# Patient Record
Sex: FEMALE | Race: WHITE | Employment: UNEMPLOYED | RURAL
[De-identification: names, ages, dates, MRNs, and addresses within clinical notes are randomized per-mention and may not be internally consistent; named-entity substitution may affect disease eponyms.]

---

## 2019-04-26 PROBLEM — R06.09 DYSPNEA ON EXERTION: Status: RESOLVED | Noted: 2017-01-26 | Resolved: 2019-04-26

## 2019-04-26 PROBLEM — I10 ESSENTIAL HYPERTENSION: Status: ACTIVE | Noted: 2017-03-16

## 2019-04-26 PROBLEM — I25.10 ATHEROSCLEROSIS OF CORONARY ARTERY: Status: ACTIVE | Noted: 2017-07-15

## 2019-04-26 PROBLEM — M67.442 GANGLION CYST OF JOINT OF FINGER OF LEFT HAND: Status: ACTIVE | Noted: 2018-11-07

## 2019-04-26 PROBLEM — R06.09 DYSPNEA ON EXERTION: Status: ACTIVE | Noted: 2017-01-26

## 2019-04-26 PROBLEM — I65.21 STENOSIS OF RIGHT CAROTID ARTERY: Status: ACTIVE | Noted: 2019-04-26

## 2019-04-26 PROBLEM — I65.23 BILATERAL CAROTID ARTERY STENOSIS: Status: ACTIVE | Noted: 2019-04-26

## 2019-04-26 PROBLEM — G89.4 CHRONIC PAIN DISORDER: Status: ACTIVE | Noted: 2019-04-19

## 2019-04-26 PROBLEM — M43.10 SPONDYLOLISTHESIS: Status: ACTIVE | Noted: 2017-02-01

## 2019-04-26 PROBLEM — Z87.42 HISTORY OF ABNORMAL CERVICAL PAP SMEAR: Status: ACTIVE | Noted: 2019-04-26

## 2019-04-26 PROBLEM — N83.202 OVARIAN CYST, LEFT: Status: ACTIVE | Noted: 2019-04-26

## 2019-05-21 PROBLEM — R23.8: Status: ACTIVE | Noted: 2018-02-26

## 2019-05-21 PROBLEM — R10.84 GENERALIZED ABDOMINAL PAIN: Status: ACTIVE | Noted: 2017-07-12

## 2019-05-21 PROBLEM — M54.2 NECK PAIN OF OVER 3 MONTHS DURATION: Status: ACTIVE | Noted: 2018-01-07

## 2019-05-21 PROBLEM — Z11.59 NEED FOR HEPATITIS C SCREENING TEST: Status: ACTIVE | Noted: 2018-08-02

## 2019-05-21 PROBLEM — K65.4 FAT NECROSIS OF ABDOMINAL WALL (HCC): Status: ACTIVE | Noted: 2017-07-18

## 2019-05-21 PROBLEM — D17.0 LIPOMA OF NECK: Status: ACTIVE | Noted: 2017-05-21

## 2019-05-22 PROBLEM — Z11.59 NEED FOR HEPATITIS C SCREENING TEST: Status: RESOLVED | Noted: 2018-08-02 | Resolved: 2019-05-22

## 2019-05-22 PROBLEM — Z87.42 HISTORY OF ABNORMAL CERVICAL PAP SMEAR: Status: RESOLVED | Noted: 2019-04-26 | Resolved: 2019-05-22

## 2019-05-22 PROBLEM — K65.4 FAT NECROSIS OF ABDOMINAL WALL (HCC): Status: RESOLVED | Noted: 2017-07-18 | Resolved: 2019-05-22

## 2019-05-22 PROBLEM — E66.01 SEVERE OBESITY (HCC): Status: ACTIVE | Noted: 2019-05-22

## 2019-05-22 PROBLEM — R10.84 GENERALIZED ABDOMINAL PAIN: Status: RESOLVED | Noted: 2017-07-12 | Resolved: 2019-05-22

## 2021-03-15 ENCOUNTER — HOSPITAL ENCOUNTER (EMERGENCY)
Age: 66
Discharge: HOME | End: 2021-03-15
Attending: EMERGENCY MEDICINE
Payer: MEDICAID

## 2021-03-15 VITALS
TEMPERATURE: 97.9 F | BODY MASS INDEX: 29.73 KG/M2 | HEART RATE: 76 BPM | OXYGEN SATURATION: 98 % | HEIGHT: 66 IN | DIASTOLIC BLOOD PRESSURE: 71 MMHG | RESPIRATION RATE: 18 BRPM | SYSTOLIC BLOOD PRESSURE: 154 MMHG | WEIGHT: 185 LBS

## 2021-03-15 DIAGNOSIS — L03.115 CELLULITIS OF RIGHT LOWER EXTREMITY: Primary | ICD-10-CM

## 2021-03-15 PROCEDURE — 99282 EMERGENCY DEPT VISIT SF MDM: CPT

## 2021-03-15 RX ORDER — CLINDAMYCIN HYDROCHLORIDE 150 MG/1
450 CAPSULE ORAL 4 TIMES DAILY
Qty: 84 CAP | Refills: 0 | Status: SHIPPED | OUTPATIENT
Start: 2021-03-15 | End: 2021-03-22

## 2021-03-15 NOTE — ED PROVIDER NOTES
EMERGENCY DEPARTMENT HISTORY AND PHYSICAL EXAM          Date: 3/15/2021  Patient Name: Cherylene Amend    History of Presenting Illness     Chief Complaint   Patient presents with    Rash       History Provided By: Patient    HPI: Cherylene Amend is a 72 y.o. female, pmhx listed below, who presents to the ED c/o leg swelling. Patient reports right leg pain and swelling gradually worsening over the last few days. Associated with leg redness. No injuries or trauma. No falls. No cough or shortness of breath. History of cellulitis approximately 20 years ago, patient reports symptoms today are similar to that. No fever. No treatments or evaluations prior to arrival.        PCP: Jennifer Mcneil NP    There are no other complaints, changes, or physical findings at this time. Past History       Past Medical History:  Past Medical History:   Diagnosis Date    Abnormal Pap smear of cervix     Back pain     Bronchitis     COPD (chronic obstructive pulmonary disease) (HCC)     Emphysema lung (HCC)     History of abnormal cervical Pap smear 4/26/2019    S/p cryotherapy in past. GYN is DR. Reddy, last pap 2016was ok.  Hyperlipemia     Hypertension        Past Surgical History:  Past Surgical History:   Procedure Laterality Date    HX BREAST AUGMENTATION         Family History:  Family History   Problem Relation Age of Onset    Lung Cancer Mother     Hypertension Mother     COPD Mother     Heart Attack Father     Stroke Father     Cancer Father        Social History:  Social History     Tobacco Use    Smoking status: Current Every Day Smoker     Types: Cigarettes    Smokeless tobacco: Never Used   Substance Use Topics    Alcohol use: Never     Frequency: Never     Binge frequency: Never    Drug use: Never       Current Outpatient Medications   Medication Sig Dispense Refill    clindamycin (CLEOCIN) 150 mg capsule Take 3 Caps by mouth four (4) times daily for 7 days.  84 Cap 0    oxyCODONE-acetaminophen (PERCOCET) 5-325 mg per tablet Take 1 Tab by mouth every four (4) hours as needed for Pain.  montelukast (SINGULAIR) 10 mg tablet Take 1 Tab by mouth daily. Indications: breathing and allergies 90 Tab 3    DULoxetine (CYMBALTA) 30 mg capsule Take 1 Cap by mouth daily. Indications: arthritis and nerve 30 Cap 11    ezetimibe (ZETIA) 10 mg tablet Take 1 Tab by mouth daily. Indications: excessive fat in the blood, cholesterol and heart 30 Tab 11    lisinopril-hydroCHLOROthiazide (PRINZIDE, ZESTORETIC) 10-12.5 mg per tablet Take 1 Tab by mouth daily. 90 Tab 3    atorvastatin (LIPITOR) 80 mg tablet Take 1 Tab by mouth daily. 90 Tab 3    ammonium lactate (AMLACTIN) 12 % topical cream Apply 1 Applicator to affected area three (3) times daily.  aspirin delayed-release 81 mg tablet Take 1 Tab by mouth daily.  omeprazole (PRILOSEC) 40 mg capsule Take 1 Cap by mouth daily as needed.  ANORO ELLIPTA 62.5-25 mcg/actuation inhaler Take 1 Puff by mouth daily. Allergies: Allergies   Allergen Reactions    Penicillin V Hives         Review of Systems   Review of Systems   Constitutional: Negative for chills and fever. HENT: Negative for congestion. Eyes: Negative for pain. Respiratory: Negative for shortness of breath. Cardiovascular: Negative for chest pain. Gastrointestinal: Negative for abdominal pain. Genitourinary: Negative for flank pain. Musculoskeletal: Negative for back pain. Skin: Negative for wound. Neurological: Negative for headaches. Psychiatric/Behavioral: Negative for agitation. Physical Exam     Vital Signs-Reviewed the patient's vital signs. Patient Vitals for the past 12 hrs:   Temp Pulse Resp BP SpO2   03/15/21 1234 97.9 °F (36.6 °C) 76 18 (!) 154/71 98 %       Physical Exam  Constitutional:       Appearance: Normal appearance. HENT:      Head: Normocephalic and atraumatic.       Mouth/Throat:      Mouth: Mucous membranes are moist.   Eyes:      Pupils: Pupils are equal, round, and reactive to light. Cardiovascular:      Rate and Rhythm: Normal rate and regular rhythm. Pulmonary:      Effort: Pulmonary effort is normal.      Breath sounds: Normal breath sounds. Abdominal:      Tenderness: There is no abdominal tenderness. Musculoskeletal:         General: No swelling. Comments: Right lower extremity erythema extending over entire lower leg (distal to the knee extending inferiorly to ankle and foot). Normal DP pulse. Associated induration and warmth. No fluctuance. No drainage. Skin:     General: Skin is warm and dry. Neurological:      Mental Status: She is alert and oriented to person, place, and time. Psychiatric:         Mood and Affect: Mood normal.         Diagnostic Study Results     Labs -   No results found for this or any previous visit (from the past 12 hour(s)). Radiologic Studies -   No orders to display     CT Results  (Last 48 hours)    None        CXR Results  (Last 48 hours)    None              Medical Decision Making   I am the first provider for this patient. I reviewed the vital signs, available nursing notes, past medical history, past surgical history, family history and social history. Records Reviewed: Nursing Notes and Old Medical Records    Provider Notes (Medical Decision Making):   MDM: 42-year-old female with lower extremity cellulitis. Patient is nontoxic-appearing. Normal distal pulses. Will give trial of oral antibiotics at home. We discussed likelihood that antibiotics would start to show improvement after about 24 hours as well as reasons to return to the emergency department such as fever or extended area of redness. All questions have been answered, pt voiced understanding and agreement with plan. Specific return precautions provided as well as instructions to return to the ED should sx worsen at any time. Vital signs stable for discharge.        Diagnosis Clinical Impression:   1. Cellulitis of right lower extremity            Disposition:  Discharged    Discharge Medication List as of 3/15/2021  1:28 PM      START taking these medications    Details   clindamycin (CLEOCIN) 150 mg capsule Take 3 Caps by mouth four (4) times daily for 7 days. , Normal, Disp-84 Cap, R-0         CONTINUE these medications which have NOT CHANGED    Details   oxyCODONE-acetaminophen (PERCOCET) 5-325 mg per tablet Take 1 Tab by mouth every four (4) hours as needed for Pain., Historical Med      montelukast (SINGULAIR) 10 mg tablet Take 1 Tab by mouth daily. Indications: breathing and allergies, Normal, Disp-90 Tab, R-3      DULoxetine (CYMBALTA) 30 mg capsule Take 1 Cap by mouth daily. Indications: arthritis and nerve, Normal, Disp-30 Cap, R-11      ezetimibe (ZETIA) 10 mg tablet Take 1 Tab by mouth daily. Indications: excessive fat in the blood, cholesterol and heart, Normal, Disp-30 Tab, R-11      lisinopril-hydroCHLOROthiazide (PRINZIDE, ZESTORETIC) 10-12.5 mg per tablet Take 1 Tab by mouth daily. , Normal, Disp-90 Tab, R-3      atorvastatin (LIPITOR) 80 mg tablet Take 1 Tab by mouth daily. , Normal, Disp-90 Tab, R-3      ammonium lactate (AMLACTIN) 12 % topical cream Apply 1 Applicator to affected area three (3) times daily. , Historical Med      aspirin delayed-release 81 mg tablet Take 1 Tab by mouth daily. , Historical Med      omeprazole (PRILOSEC) 40 mg capsule Take 1 Cap by mouth daily as needed., Historical Med      ANORO ELLIPTA 62.5-25 mcg/actuation inhaler Take 1 Puff by mouth daily. , Historical Med, DIEGO               Please note, this dictation was completed with [employer], the [employer] voice recognition software. Quite often unanticipated grammatical, syntax, homophones, and other interpretive errors are inadvertently transcribed by the computer software. Please disregard these errors. Please excuse any errors that have escaped final proof reading.

## 2021-03-15 NOTE — ED NOTES
1st contact with this patient:  Patient identified. Patient noted to have arrived by POV with complaint of right leg swelling and possible cellulitis. Pt reports she noticed some redness on the top of her foot that spread up her right leg and now she has swelling and pain. Pt is awake alert and oriented X 4  This writer apologized for having her sit in triage but at this time no beds available and once a bed is available than she will be moved, pt vocalized understanding.   Pt educated on ER flow pt awaits provider eval.

## 2021-04-01 ENCOUNTER — TRANSCRIBE ORDER (OUTPATIENT)
Dept: SCHEDULING | Age: 66
End: 2021-04-01

## 2021-04-01 DIAGNOSIS — M79.89 SWELLING OF LIMB: Primary | ICD-10-CM

## 2021-04-01 DIAGNOSIS — R22.1 NECK MASS: ICD-10-CM

## 2021-04-07 ENCOUNTER — HOSPITAL ENCOUNTER (OUTPATIENT)
Dept: ULTRASOUND IMAGING | Age: 66
Discharge: HOME | End: 2021-04-07
Attending: NURSE PRACTITIONER
Payer: MEDICAID

## 2021-04-07 DIAGNOSIS — M79.89 SWELLING OF LIMB: ICD-10-CM

## 2021-04-07 DIAGNOSIS — R22.1 NECK MASS: ICD-10-CM

## 2021-04-07 PROCEDURE — 93971 EXTREMITY STUDY: CPT

## 2021-04-07 PROCEDURE — 76536 US EXAM OF HEAD AND NECK: CPT

## 2021-04-14 ENCOUNTER — TRANSCRIBE ORDER (OUTPATIENT)
Dept: SCHEDULING | Age: 66
End: 2021-04-14

## 2021-04-14 DIAGNOSIS — I65.23 BILATERAL CAROTID ARTERY OCCLUSION: Primary | ICD-10-CM

## 2021-04-20 ENCOUNTER — HOSPITAL ENCOUNTER (OUTPATIENT)
Dept: ULTRASOUND IMAGING | Age: 66
Discharge: HOME | End: 2021-04-20
Attending: NURSE PRACTITIONER
Payer: MEDICAID

## 2021-04-20 DIAGNOSIS — I65.23 BILATERAL CAROTID ARTERY OCCLUSION: ICD-10-CM

## 2021-04-20 LAB
LEFT CCA DIST DIAS: 28.1 CENTIMETER/SECOND
LEFT CCA DIST SYS: 117.4 CENTIMETER/SECOND
LEFT ECA DIAS: 17.86 CENTIMETER/SECOND
LEFT ECA SYS: 146.6 CENTIMETER/SECOND
LEFT ICA DIST DIAS: 24 CENTIMETER/SECOND
LEFT ICA DIST SYS: 85.8 CENTIMETER/SECOND
LEFT ICA MID DIAS: 20.3 CENTIMETER/SECOND
LEFT ICA MID SYS: 84.3 CENTIMETER/SECOND
LEFT ICA PROX DIAS: 24 CM/S
LEFT ICA PROX SYS: 74 CM/S
LEFT ICA/CCA SYS: 0.73
LEFT VERTEBRAL DIAS: 15.98 CENTIMETER/SECOND
LEFT VERTEBRAL SYS: 42.9 CENTIMETER/SECOND
RIGHT CCA DIST DIAS: 20.3 CENTIMETER/SECOND
RIGHT CCA DIST SYS: 81.2 CENTIMETER/SECOND
RIGHT ECA DIAS: 38.12 CENTIMETER/SECOND
RIGHT ECA SYS: 244.4 CENTIMETER/SECOND
RIGHT ICA DIST DIAS: 26.1 CENTIMETER/SECOND
RIGHT ICA DIST SYS: 76 CENTIMETER/SECOND
RIGHT ICA MID DIAS: 42 CM/S
RIGHT ICA MID SYS: 165 CM/S
RIGHT ICA PROX DIAS: 59 CM/S
RIGHT ICA PROX SYS: 153 CM/S
RIGHT ICA/CCA SYS: 1.9
RIGHT VERTEBRAL DIAS: 24.41 CENTIMETER/SECOND
RIGHT VERTEBRAL SYS: 77 CENTIMETER/SECOND

## 2021-04-20 PROCEDURE — 93880 EXTRACRANIAL BILAT STUDY: CPT

## 2021-07-21 ENCOUNTER — HOSPITAL ENCOUNTER (OUTPATIENT)
Dept: GENERAL RADIOLOGY | Age: 66
Discharge: HOME | End: 2021-07-21
Payer: MEDICAID

## 2021-07-21 ENCOUNTER — TRANSCRIBE ORDER (OUTPATIENT)
Dept: REGISTRATION | Age: 66
End: 2021-07-21

## 2021-07-21 DIAGNOSIS — M54.2 NECK PAIN: Primary | ICD-10-CM

## 2021-07-21 DIAGNOSIS — M48.061 NEURAL FORAMINAL STENOSIS OF LUMBAR SPINE: ICD-10-CM

## 2021-07-21 DIAGNOSIS — M54.2 NECK PAIN: ICD-10-CM

## 2021-07-21 PROCEDURE — 72040 X-RAY EXAM NECK SPINE 2-3 VW: CPT

## 2021-07-21 PROCEDURE — 72100 X-RAY EXAM L-S SPINE 2/3 VWS: CPT

## 2021-12-06 ENCOUNTER — HOSPITAL ENCOUNTER (OUTPATIENT)
Dept: ULTRASOUND IMAGING | Age: 66
Discharge: HOME | End: 2021-12-06
Attending: INTERNAL MEDICINE
Payer: MEDICAID

## 2021-12-06 ENCOUNTER — TRANSCRIBE ORDER (OUTPATIENT)
Dept: SCHEDULING | Age: 66
End: 2021-12-06

## 2021-12-06 DIAGNOSIS — M79.604 PAIN OF RIGHT LOWER EXTREMITY: Primary | ICD-10-CM

## 2021-12-06 DIAGNOSIS — M79.604 PAIN OF RIGHT LOWER EXTREMITY: ICD-10-CM

## 2021-12-06 PROCEDURE — 93970 EXTREMITY STUDY: CPT

## 2022-01-11 ENCOUNTER — TRANSCRIBE ORDER (OUTPATIENT)
Dept: SCHEDULING | Age: 67
End: 2022-01-11

## 2022-01-11 DIAGNOSIS — R60.0 EDEMA OF BOTH LEGS: ICD-10-CM

## 2022-01-11 DIAGNOSIS — R60.9 EDEMA: ICD-10-CM

## 2022-01-11 DIAGNOSIS — R59.9 ENLARGED LYMPH NODE: ICD-10-CM

## 2022-01-11 DIAGNOSIS — I10 ESSENTIAL HYPERTENSION: Primary | ICD-10-CM

## 2022-01-11 DIAGNOSIS — I73.9 PERIPHERAL VASCULAR DISEASE (HCC): ICD-10-CM

## 2022-01-19 ENCOUNTER — HOSPITAL ENCOUNTER (OUTPATIENT)
Dept: ULTRASOUND IMAGING | Age: 67
Discharge: HOME | End: 2022-01-19
Attending: NURSE PRACTITIONER
Payer: MEDICAID

## 2022-01-19 DIAGNOSIS — I10 ESSENTIAL HYPERTENSION: ICD-10-CM

## 2022-01-19 DIAGNOSIS — R59.9 ENLARGED LYMPH NODE: ICD-10-CM

## 2022-01-19 DIAGNOSIS — R60.9 EDEMA: ICD-10-CM

## 2022-01-19 DIAGNOSIS — R60.0 EDEMA OF BOTH LEGS: ICD-10-CM

## 2022-01-19 DIAGNOSIS — I73.9 PERIPHERAL VASCULAR DISEASE (HCC): ICD-10-CM

## 2022-01-19 LAB
ECHO AO ROOT DIAM: 3 CM
ECHO LA DIAMETER: 3.7 CM
ECHO LA TO AORTIC ROOT RATIO: 1.23
ECHO LA VOL 2C: 53 ML (ref 22–52)
ECHO LA VOL 4C: 50 ML (ref 22–52)
ECHO LA VOLUME AREA LENGTH: 54 ML
ECHO LV EDV A2C: 79 ML
ECHO LV EDV A4C: 86 ML
ECHO LV EDV BP: 83 ML (ref 56–104)
ECHO LV EDV BP: 83 ML (ref 56–104)
ECHO LV EJECTION FRACTION A2C: 73 %
ECHO LV EJECTION FRACTION A4C: 63 %
ECHO LV EJECTION FRACTION BIPLANE: 68 % (ref 55–100)
ECHO LV EJECTION FRACTION BIPLANE: 68 % (ref 55–100)
ECHO LV ESV A2C: 21 ML
ECHO LV ESV A4C: 32 ML
ECHO LV ESV BP: 27 ML (ref 19–49)
ECHO LV FRACTIONAL SHORTENING: 48 % (ref 28–44)
ECHO LV INTERNAL DIMENSION DIASTOLIC: 5 CM (ref 3.9–5.3)
ECHO LV INTERNAL DIMENSION SYSTOLIC: 2.6 CM
ECHO LV IVSD: 1.1 CM (ref 0.6–0.9)
ECHO LV MASS 2D: 194.4 G (ref 67–162)
ECHO LV POSTERIOR WALL DIASTOLIC: 1 CM (ref 0.6–0.9)
ECHO LV RELATIVE WALL THICKNESS RATIO: 0.4
ECHO LVOT AREA: 3.1 CM2
ECHO LVOT DIAM: 2 CM
LEFT ABI: 1.03
LEFT ARM BP: 154 MMHG
LEFT POSTERIOR TIBIAL: 158 MMHG
RIGHT ABI: 1.06
RIGHT ARM BP: 151 MMHG
RIGHT POSTERIOR TIBIAL: 164 MMHG
VAS LEFT DORSALIS PEDIS BP: 158 MMHG
VAS RIGHT DORSALIS PEDIS BP: 151 MMHG

## 2022-01-19 PROCEDURE — 76536 US EXAM OF HEAD AND NECK: CPT

## 2022-01-19 PROCEDURE — 93308 TTE F-UP OR LMTD: CPT

## 2022-01-19 PROCEDURE — 93325 DOPPLER ECHO COLOR FLOW MAPG: CPT | Performed by: INTERNAL MEDICINE

## 2022-01-19 PROCEDURE — 93308 TTE F-UP OR LMTD: CPT | Performed by: INTERNAL MEDICINE

## 2022-01-19 PROCEDURE — 93922 UPR/L XTREMITY ART 2 LEVELS: CPT

## 2022-03-18 PROBLEM — E66.01 SEVERE OBESITY (HCC): Status: ACTIVE | Noted: 2019-05-22

## 2022-03-18 PROBLEM — I10 ESSENTIAL HYPERTENSION: Status: ACTIVE | Noted: 2017-03-16

## 2022-03-18 PROBLEM — R23.8: Status: ACTIVE | Noted: 2018-02-26

## 2022-03-18 PROBLEM — M43.10 SPONDYLOLISTHESIS: Status: ACTIVE | Noted: 2017-02-01

## 2022-03-19 PROBLEM — N83.202 OVARIAN CYST, LEFT: Status: ACTIVE | Noted: 2019-04-26

## 2022-03-19 PROBLEM — I65.23 BILATERAL CAROTID ARTERY STENOSIS: Status: ACTIVE | Noted: 2019-04-26

## 2022-03-19 PROBLEM — I25.10 ATHEROSCLEROSIS OF CORONARY ARTERY: Status: ACTIVE | Noted: 2017-07-15

## 2022-03-19 PROBLEM — M54.2 NECK PAIN OF OVER 3 MONTHS DURATION: Status: ACTIVE | Noted: 2018-01-07

## 2022-03-20 PROBLEM — D17.0 LIPOMA OF NECK: Status: ACTIVE | Noted: 2017-05-21

## 2022-03-20 PROBLEM — M67.442 GANGLION CYST OF JOINT OF FINGER OF LEFT HAND: Status: ACTIVE | Noted: 2018-11-07

## 2022-03-20 PROBLEM — G89.4 CHRONIC PAIN DISORDER: Status: ACTIVE | Noted: 2019-04-19

## 2022-07-15 ENCOUNTER — TRANSCRIBE ORDER (OUTPATIENT)
Dept: REGISTRATION | Age: 67
End: 2022-07-15

## 2022-07-15 ENCOUNTER — HOSPITAL ENCOUNTER (OUTPATIENT)
Dept: GENERAL RADIOLOGY | Age: 67
Discharge: HOME | End: 2022-07-15
Payer: MEDICAID

## 2022-07-15 DIAGNOSIS — F17.210 CIGARETTE SMOKER: Primary | ICD-10-CM

## 2022-07-15 DIAGNOSIS — F17.210 CIGARETTE SMOKER: ICD-10-CM

## 2022-07-15 PROCEDURE — 71046 X-RAY EXAM CHEST 2 VIEWS: CPT

## 2022-09-07 ENCOUNTER — APPOINTMENT (OUTPATIENT)
Dept: ULTRASOUND IMAGING | Age: 67
End: 2022-09-07
Attending: EMERGENCY MEDICINE
Payer: MEDICAID

## 2022-09-07 ENCOUNTER — HOSPITAL ENCOUNTER (EMERGENCY)
Age: 67
Discharge: HOME | End: 2022-09-07
Attending: EMERGENCY MEDICINE
Payer: MEDICAID

## 2022-09-07 VITALS
SYSTOLIC BLOOD PRESSURE: 111 MMHG | DIASTOLIC BLOOD PRESSURE: 58 MMHG | TEMPERATURE: 97.8 F | OXYGEN SATURATION: 97 % | RESPIRATION RATE: 18 BRPM | HEART RATE: 68 BPM

## 2022-09-07 DIAGNOSIS — L03.115 CELLULITIS OF RIGHT LOWER EXTREMITY: Primary | ICD-10-CM

## 2022-09-07 LAB
ALBUMIN SERPL-MCNC: 4.1 G/DL (ref 3.5–5)
ALBUMIN/GLOB SERPL: 1.4 {RATIO} (ref 1.1–2.2)
ALP SERPL-CCNC: 75 U/L (ref 45–117)
ALT SERPL-CCNC: 27 U/L (ref 12–78)
ANION GAP SERPL CALC-SCNC: 8 MMOL/L (ref 5–15)
APPEARANCE UR: CLEAR
AST SERPL-CCNC: 22 U/L (ref 15–37)
BASOPHILS # BLD: 0.1 K/UL (ref 0–0.1)
BASOPHILS NFR BLD: 1 % (ref 0–1)
BILIRUB SERPL-MCNC: 0.4 MG/DL (ref 0.2–1)
BILIRUB UR QL: NEGATIVE
BNP SERPL-MCNC: 38 PG/ML (ref 0–125)
BUN SERPL-MCNC: 9 MG/DL (ref 6–20)
BUN/CREAT SERPL: 14 (ref 12–20)
CALCIUM SERPL-MCNC: 9.6 MG/DL (ref 8.5–10.1)
CHLORIDE SERPL-SCNC: 104 MMOL/L (ref 97–108)
CO2 SERPL-SCNC: 27 MMOL/L (ref 21–32)
COLOR UR: NORMAL
CREAT SERPL-MCNC: 0.66 MG/DL (ref 0.55–1.02)
DIFFERENTIAL METHOD BLD: ABNORMAL
EOSINOPHIL # BLD: 0.5 K/UL (ref 0–0.4)
EOSINOPHIL NFR BLD: 7 % (ref 0–7)
ERYTHROCYTE [DISTWIDTH] IN BLOOD BY AUTOMATED COUNT: 13.4 % (ref 11.5–14.5)
GLOBULIN SER CALC-MCNC: 2.9 G/DL (ref 2–4)
GLUCOSE SERPL-MCNC: 85 MG/DL (ref 65–100)
GLUCOSE UR STRIP.AUTO-MCNC: NEGATIVE MG/DL
HCT VFR BLD AUTO: 40.3 % (ref 35–47)
HGB BLD-MCNC: 13.4 G/DL (ref 11.5–16)
HGB UR QL STRIP: NEGATIVE
IMM GRANULOCYTES # BLD AUTO: 0 K/UL (ref 0–0.04)
IMM GRANULOCYTES NFR BLD AUTO: 0 % (ref 0–0.5)
KETONES UR QL STRIP.AUTO: NEGATIVE MG/DL
LEUKOCYTE ESTERASE UR QL STRIP.AUTO: NEGATIVE
LYMPHOCYTES # BLD: 2.3 K/UL (ref 0.8–3.5)
LYMPHOCYTES NFR BLD: 33 % (ref 12–49)
MAGNESIUM SERPL-MCNC: 2.3 MG/DL (ref 1.6–2.4)
MCH RBC QN AUTO: 32.4 PG (ref 26–34)
MCHC RBC AUTO-ENTMCNC: 33.3 G/DL (ref 30–36.5)
MCV RBC AUTO: 97.6 FL (ref 80–99)
MONOCYTES # BLD: 0.5 K/UL (ref 0–1)
MONOCYTES NFR BLD: 7 % (ref 5–13)
NEUTS SEG # BLD: 3.5 K/UL (ref 1.8–8)
NEUTS SEG NFR BLD: 52 % (ref 32–75)
NITRITE UR QL STRIP.AUTO: NEGATIVE
NRBC # BLD: 0 K/UL (ref 0–0.01)
NRBC BLD-RTO: 0 PER 100 WBC
PH UR STRIP: 6 [PH] (ref 5–8)
PLATELET # BLD AUTO: 197 K/UL (ref 150–400)
PMV BLD AUTO: 9.7 FL (ref 8.9–12.9)
POTASSIUM SERPL-SCNC: 3.8 MMOL/L (ref 3.5–5.1)
PROT SERPL-MCNC: 7 G/DL (ref 6.4–8.2)
PROT UR STRIP-MCNC: NEGATIVE MG/DL
RBC # BLD AUTO: 4.13 M/UL (ref 3.8–5.2)
SODIUM SERPL-SCNC: 139 MMOL/L (ref 136–145)
SP GR UR REFRACTOMETRY: <1.005 (ref 1–1.03)
UROBILINOGEN UR QL STRIP.AUTO: 0.2 EU/DL (ref 0.2–1)
WBC # BLD AUTO: 6.8 K/UL (ref 3.6–11)

## 2022-09-07 PROCEDURE — 85025 COMPLETE CBC W/AUTO DIFF WBC: CPT

## 2022-09-07 PROCEDURE — 83735 ASSAY OF MAGNESIUM: CPT

## 2022-09-07 PROCEDURE — 83880 ASSAY OF NATRIURETIC PEPTIDE: CPT

## 2022-09-07 PROCEDURE — 80053 COMPREHEN METABOLIC PANEL: CPT

## 2022-09-07 PROCEDURE — 81003 URINALYSIS AUTO W/O SCOPE: CPT

## 2022-09-07 PROCEDURE — 93970 EXTREMITY STUDY: CPT

## 2022-09-07 PROCEDURE — 99284 EMERGENCY DEPT VISIT MOD MDM: CPT

## 2022-09-07 PROCEDURE — 36415 COLL VENOUS BLD VENIPUNCTURE: CPT

## 2022-09-07 RX ORDER — SODIUM CHLORIDE 0.9 % (FLUSH) 0.9 %
5-10 SYRINGE (ML) INJECTION ONCE
Status: DISCONTINUED | OUTPATIENT
Start: 2022-09-07 | End: 2022-09-07 | Stop reason: HOSPADM

## 2022-09-07 RX ORDER — HYDROCODONE BITARTRATE AND ACETAMINOPHEN 5; 325 MG/1; MG/1
1 TABLET ORAL
Qty: 10 TABLET | Refills: 0 | Status: SHIPPED | OUTPATIENT
Start: 2022-09-07 | End: 2022-09-10

## 2022-09-07 RX ORDER — CEPHALEXIN 500 MG/1
500 CAPSULE ORAL 3 TIMES DAILY
Qty: 21 CAPSULE | Refills: 0 | Status: SHIPPED | OUTPATIENT
Start: 2022-09-07 | End: 2022-09-14

## 2022-09-07 NOTE — ED PROVIDER NOTES
EMERGENCY DEPARTMENT HISTORY AND PHYSICAL EXAM          Date: 9/7/2022  Patient Name: Lionel Flannery    History of Presenting Illness     Chief Complaint   Patient presents with    Leg Swelling       History Provided By: Patient    HPI: Lionel Flannery is a 77 y.o. female, pmhx COPD, hypertension, who presents ambulatory to the ED c/o leg sleg swelling with wounds    She explains she had a wound on her left thigh which she noticed a couple of days ago and one on her right lower leg. She states her not healing and now it seems to be getting worse with surrounding erythema. She denies any fevers, chills, nausea, vomiting, diarrhea as well as abdominal cramping. She notes they are itchy and painful. PCP: Brooke Ann NP    Allergies: Penicillin    There are no other complaints, changes, or physical findings at this time. Current Outpatient Medications   Medication Sig Dispense Refill    ondansetron (ZOFRAN ODT) 4 mg disintegrating tablet Take 1 Tablet by mouth every eight (8) hours as needed for Nausea. 6 Tablet 0    cephALEXin (Keflex) 500 mg capsule Take 1 Capsule by mouth three (3) times daily for 7 days. 21 Capsule 0    HYDROcodone-acetaminophen (Norco) 5-325 mg per tablet Take 1 Tablet by mouth every six (6) hours as needed for Pain for up to 3 days. Max Daily Amount: 4 Tablets. 10 Tablet 0    oxyCODONE-acetaminophen (PERCOCET) 5-325 mg per tablet Take 1 Tab by mouth every four (4) hours as needed for Pain.      montelukast (SINGULAIR) 10 mg tablet Take 1 Tab by mouth daily. Indications: breathing and allergies 90 Tab 3    DULoxetine (CYMBALTA) 30 mg capsule Take 1 Cap by mouth daily. Indications: arthritis and nerve 30 Cap 11    ezetimibe (ZETIA) 10 mg tablet Take 1 Tab by mouth daily. Indications: excessive fat in the blood, cholesterol and heart 30 Tab 11    lisinopril-hydroCHLOROthiazide (PRINZIDE, ZESTORETIC) 10-12.5 mg per tablet Take 1 Tab by mouth daily.  90 Tab 3    atorvastatin (LIPITOR) 80 mg tablet Take 1 Tab by mouth daily. 90 Tab 3    ammonium lactate (AMLACTIN) 12 % topical cream Apply 1 Applicator to affected area three (3) times daily. aspirin delayed-release 81 mg tablet Take 1 Tab by mouth daily. omeprazole (PRILOSEC) 40 mg capsule Take 1 Cap by mouth daily as needed. ANORO ELLIPTA 62.5-25 mcg/actuation inhaler Take 1 Puff by mouth daily. Past History     Past Medical History:  Past Medical History:   Diagnosis Date    Abnormal Pap smear of cervix     Back pain     Bronchitis     COPD (chronic obstructive pulmonary disease) (HCC)     Emphysema lung (HCC)     History of abnormal cervical Pap smear 4/26/2019    S/p cryotherapy in past. GYN is DR. Reddy, last pap 2016was ok. Hyperlipemia     Hypertension        Past Surgical History:  Past Surgical History:   Procedure Laterality Date    HX BREAST AUGMENTATION         Family History:  Family History   Problem Relation Age of Onset    Lung Cancer Mother     Hypertension Mother     COPD Mother     Heart Attack Father     Stroke Father     Cancer Father        Social History:  Social History     Tobacco Use    Smoking status: Every Day     Types: Cigarettes    Smokeless tobacco: Never   Vaping Use    Vaping Use: Never used   Substance Use Topics    Alcohol use: Never    Drug use: Never       Allergies: Allergies   Allergen Reactions    Penicillin V Hives         Review of Systems   Review of Systems   Constitutional:  Negative for activity change, appetite change, chills, fever and unexpected weight change. HENT:  Negative for congestion. Eyes:  Negative for pain and visual disturbance. Respiratory:  Negative for cough and shortness of breath. Cardiovascular:  Positive for leg swelling. Negative for chest pain. Gastrointestinal:  Negative for abdominal pain, diarrhea, nausea and vomiting. Genitourinary:  Negative for dysuria. Musculoskeletal:  Negative for back pain. Skin:  Positive for wound.  Negative for rash. Neurological:  Negative for headaches. Physical Exam   Physical Exam  Vitals and nursing note reviewed. Constitutional:       Appearance: She is well-developed. She is not diaphoretic. Comments: Overweight middle-aged female, hemodynamically stable but appearing uncomfortable in mild acute distress   HENT:      Head: Normocephalic and atraumatic. Eyes:      General:         Right eye: No discharge. Left eye: No discharge. Conjunctiva/sclera: Conjunctivae normal.      Pupils: Pupils are equal, round, and reactive to light. Cardiovascular:      Rate and Rhythm: Normal rate and regular rhythm. Heart sounds: Normal heart sounds. No murmur heard. Pulmonary:      Effort: Pulmonary effort is normal. No respiratory distress. Breath sounds: Normal breath sounds. No wheezing or rales. Musculoskeletal:         General: Normal range of motion. Cervical back: Normal range of motion and neck supple. Skin:     General: Skin is warm and dry. Findings: Erythema (Right lower leg from the mid anterior shin level down to the ankle; left anterior thigh) present. No rash. Comments: There are bite site/wound sites in the middle of these erythematous lesions. The center of these wounds appears to be in a necrotic black lesion   Neurological:      Mental Status: She is alert and oriented to person, place, and time. Cranial Nerves: No cranial nerve deficit. Motor: No abnormal muscle tone. Diagnostic Study Results     Labs -   No results found for this or any previous visit (from the past 12 hour(s)). Radiologic Studies -   DUPLEX LOWER EXT VENOUS BILAT   Final Result        CT Results  (Last 48 hours)      None          CXR Results  (Last 48 hours)      None              Medical Decision Making   I am the first provider for this patient.     I reviewed the vital signs, available nursing notes, past medical history, past surgical history, family history and social history. Vital Signs-Reviewed the patient's vital signs. Pulse Oximetry Analysis - 97% on RA    Records Reviewed: Nursing Notes and Old Medical Records    Provider Notes (Medical Decision Making):   MDM: Middle-aged female sitting in the hallway with normal vital signs and what appears to be some right lower extremity edema. Patient is wearing skinny jeans I have requested her to be released in a room, undressed for further evaluation. ED Course:   Initial assessment performed. The patients presenting problems have been discussed, and they are in agreement with the care plan formulated and outlined with them. I have encouraged them to ask questions as they arise throughout their visit. PROGRESS NOTE:  3:10PM  Pt reevaluated while undressed. She has lesions as mentioned above in the exam.  Concern for possible brown recluse bite given the appearance of the lesions. We will initiate antibiotics here as there is surrounding erythema concerning for early cellulitis. Discussed with patient wound care and if provided information for the [facility] wound care clinic. Discharge note:  Pt re-evaluated and noted to be feeling better, ready for discharge. Updated pt on all final lab findings. Will follow up as instructed. All questions have been answered, pt voiced understanding and agreement with plan. Abx were prescribed, pt advised that diarrhea and rash are possible side effects of the medications. Specific return precautions provided as well as instructions to return to the ED should sx worsen at any time. Vital signs stable for discharge. Critical Care Time:   0      Diagnosis     Clinical Impression:   1. Cellulitis of right lower extremity        PLAN:  1. Discharge Medication List as of 9/7/2022  2:52 PM        START taking these medications    Details   cephALEXin (Keflex) 500 mg capsule Take 1 Capsule by mouth three (3) times daily for 7 days. , Normal, Disp-21 Capsule, R-0      HYDROcodone-acetaminophen (Norco) 5-325 mg per tablet Take 1 Tablet by mouth every six (6) hours as needed for Pain for up to 3 days. Max Daily Amount: 4 Tablets., Normal, Disp-10 Tablet, R-0           CONTINUE these medications which have NOT CHANGED    Details   oxyCODONE-acetaminophen (PERCOCET) 5-325 mg per tablet Take 1 Tab by mouth every four (4) hours as needed for Pain., Historical Med      montelukast (SINGULAIR) 10 mg tablet Take 1 Tab by mouth daily. Indications: breathing and allergies, Normal, Disp-90 Tab, R-3      DULoxetine (CYMBALTA) 30 mg capsule Take 1 Cap by mouth daily. Indications: arthritis and nerve, Normal, Disp-30 Cap, R-11      ezetimibe (ZETIA) 10 mg tablet Take 1 Tab by mouth daily. Indications: excessive fat in the blood, cholesterol and heart, Normal, Disp-30 Tab, R-11      lisinopril-hydroCHLOROthiazide (PRINZIDE, ZESTORETIC) 10-12.5 mg per tablet Take 1 Tab by mouth daily. , Normal, Disp-90 Tab, R-3      atorvastatin (LIPITOR) 80 mg tablet Take 1 Tab by mouth daily. , Normal, Disp-90 Tab, R-3      ammonium lactate (AMLACTIN) 12 % topical cream Apply 1 Applicator to affected area three (3) times daily. , Historical Med      aspirin delayed-release 81 mg tablet Take 1 Tab by mouth daily. , Historical Med      omeprazole (PRILOSEC) 40 mg capsule Take 1 Cap by mouth daily as needed., Historical Med      ANORO ELLIPTA 62.5-25 mcg/actuation inhaler Take 1 Puff by mouth daily. , Historical Med, DIEGO           2. Follow-up Information       Follow up With Specialties Details Why Contact Info    [address] [place] [address] Emergency Medicine  If symptoms worsen [address]  [id number]    [facility] OP WOUND CARE Wound Care Schedule an appointment as soon as possible for a visit   [address] [place] [address] 1  Jakub.  [address] [place] [address]  [phone]          Return to ED if worse     Disposition:  Home       Please note, this dictation was completed with Dragon, the computer voice recognition software. Quite often unanticipated grammatical, syntax, homophones, and other interpretive errors are inadvertently transcribed by the computer software. Please disregard these errors. Please excuse any errors that have escaped final proof reading.

## 2022-09-07 NOTE — Clinical Note
[address] [place] [address] EMERGENCY DEP  [address] [place] [address]  [phone]    Work/School Note    Date: 9/7/2022    To Whom It May concern:    Ninoska Khan was seen and treated today in the emergency room by the following provider(s):  Attending Provider: Annika Fonseca MD.      Ninoska Khan is excused from work/school on 09/07/22 and 09/08/22. She is medically clear to return to work/school on 9/9/2022. Sincerely,          India Perdomo.  MD Mariel

## 2022-09-08 RX ORDER — ONDANSETRON 4 MG/1
4 TABLET, ORALLY DISINTEGRATING ORAL
Qty: 6 TABLET | Refills: 0 | Status: SHIPPED | OUTPATIENT
Start: 2022-09-08

## 2023-05-24 ENCOUNTER — OFFICE VISIT (OUTPATIENT)
Age: 68
End: 2023-05-24
Payer: MEDICAID

## 2023-05-24 VITALS
TEMPERATURE: 97.3 F | OXYGEN SATURATION: 98 % | BODY MASS INDEX: 29.89 KG/M2 | SYSTOLIC BLOOD PRESSURE: 120 MMHG | HEART RATE: 72 BPM | DIASTOLIC BLOOD PRESSURE: 60 MMHG | HEIGHT: 66 IN | WEIGHT: 186 LBS | RESPIRATION RATE: 18 BRPM

## 2023-05-24 DIAGNOSIS — G89.29 OTHER CHRONIC PAIN: ICD-10-CM

## 2023-05-24 DIAGNOSIS — F17.200 SMOKING: ICD-10-CM

## 2023-05-24 DIAGNOSIS — Z12.31 ENCOUNTER FOR SCREENING MAMMOGRAM FOR MALIGNANT NEOPLASM OF BREAST: ICD-10-CM

## 2023-05-24 DIAGNOSIS — I65.23 BILATERAL CAROTID ARTERY STENOSIS: ICD-10-CM

## 2023-05-24 DIAGNOSIS — E78.2 MIXED HYPERLIPIDEMIA: ICD-10-CM

## 2023-05-24 DIAGNOSIS — R73.9 HYPERGLYCEMIA: Primary | ICD-10-CM

## 2023-05-24 DIAGNOSIS — M47.816 SPONDYLOSIS OF LUMBAR REGION WITHOUT MYELOPATHY OR RADICULOPATHY: ICD-10-CM

## 2023-05-24 DIAGNOSIS — Z51.81 THERAPEUTIC DRUG MONITORING: ICD-10-CM

## 2023-05-24 DIAGNOSIS — I10 ESSENTIAL HYPERTENSION: ICD-10-CM

## 2023-05-24 DIAGNOSIS — I25.10 ATHEROSCLEROSIS OF NATIVE CORONARY ARTERY OF NATIVE HEART WITHOUT ANGINA PECTORIS: ICD-10-CM

## 2023-05-24 PROCEDURE — 3078F DIAST BP <80 MM HG: CPT | Performed by: FAMILY MEDICINE

## 2023-05-24 PROCEDURE — 99203 OFFICE O/P NEW LOW 30 MIN: CPT | Performed by: FAMILY MEDICINE

## 2023-05-24 PROCEDURE — 3074F SYST BP LT 130 MM HG: CPT | Performed by: FAMILY MEDICINE

## 2023-05-24 PROCEDURE — 1123F ACP DISCUSS/DSCN MKR DOCD: CPT | Performed by: FAMILY MEDICINE

## 2023-05-24 RX ORDER — AMLODIPINE BESYLATE 10 MG/1
10 TABLET ORAL DAILY
COMMUNITY

## 2023-05-24 RX ORDER — ATORVASTATIN CALCIUM 80 MG/1
80 TABLET, FILM COATED ORAL DAILY
Qty: 30 TABLET | Refills: 11 | Status: SHIPPED | OUTPATIENT
Start: 2023-05-24

## 2023-05-24 RX ORDER — NICOTINE 21 MG/24HR
1 PATCH, TRANSDERMAL 24 HOURS TRANSDERMAL DAILY
Qty: 42 PATCH | Refills: 3 | Status: SHIPPED | OUTPATIENT
Start: 2023-05-24 | End: 2023-07-05

## 2023-05-24 RX ORDER — ALBUTEROL SULFATE 2.5 MG/3ML
2.5 SOLUTION RESPIRATORY (INHALATION) EVERY 4 HOURS PRN
COMMUNITY

## 2023-05-24 SDOH — ECONOMIC STABILITY: FOOD INSECURITY: WITHIN THE PAST 12 MONTHS, THE FOOD YOU BOUGHT JUST DIDN'T LAST AND YOU DIDN'T HAVE MONEY TO GET MORE.: NEVER TRUE

## 2023-05-24 SDOH — ECONOMIC STABILITY: HOUSING INSECURITY
IN THE LAST 12 MONTHS, WAS THERE A TIME WHEN YOU DID NOT HAVE A STEADY PLACE TO SLEEP OR SLEPT IN A SHELTER (INCLUDING NOW)?: NO

## 2023-05-24 SDOH — ECONOMIC STABILITY: FOOD INSECURITY: WITHIN THE PAST 12 MONTHS, YOU WORRIED THAT YOUR FOOD WOULD RUN OUT BEFORE YOU GOT MONEY TO BUY MORE.: NEVER TRUE

## 2023-05-24 SDOH — ECONOMIC STABILITY: INCOME INSECURITY: HOW HARD IS IT FOR YOU TO PAY FOR THE VERY BASICS LIKE FOOD, HOUSING, MEDICAL CARE, AND HEATING?: NOT HARD AT ALL

## 2023-05-24 ASSESSMENT — PATIENT HEALTH QUESTIONNAIRE - PHQ9
3. TROUBLE FALLING OR STAYING ASLEEP: 0
SUM OF ALL RESPONSES TO PHQ QUESTIONS 1-9: 0
SUM OF ALL RESPONSES TO PHQ QUESTIONS 1-9: 0
2. FEELING DOWN, DEPRESSED OR HOPELESS: 0
7. TROUBLE CONCENTRATING ON THINGS, SUCH AS READING THE NEWSPAPER OR WATCHING TELEVISION: 0
SUM OF ALL RESPONSES TO PHQ9 QUESTIONS 1 & 2: 0
SUM OF ALL RESPONSES TO PHQ QUESTIONS 1-9: 0
4. FEELING TIRED OR HAVING LITTLE ENERGY: 0
10. IF YOU CHECKED OFF ANY PROBLEMS, HOW DIFFICULT HAVE THESE PROBLEMS MADE IT FOR YOU TO DO YOUR WORK, TAKE CARE OF THINGS AT HOME, OR GET ALONG WITH OTHER PEOPLE: 0
SUM OF ALL RESPONSES TO PHQ QUESTIONS 1-9: 0
1. LITTLE INTEREST OR PLEASURE IN DOING THINGS: 0
SUM OF ALL RESPONSES TO PHQ QUESTIONS 1-9: 0
2. FEELING DOWN, DEPRESSED OR HOPELESS: 0
SUM OF ALL RESPONSES TO PHQ QUESTIONS 1-9: 0
6. FEELING BAD ABOUT YOURSELF - OR THAT YOU ARE A FAILURE OR HAVE LET YOURSELF OR YOUR FAMILY DOWN: 0
SUM OF ALL RESPONSES TO PHQ QUESTIONS 1-9: 0
SUM OF ALL RESPONSES TO PHQ QUESTIONS 1-9: 0
8. MOVING OR SPEAKING SO SLOWLY THAT OTHER PEOPLE COULD HAVE NOTICED. OR THE OPPOSITE, BEING SO FIGETY OR RESTLESS THAT YOU HAVE BEEN MOVING AROUND A LOT MORE THAN USUAL: 0
9. THOUGHTS THAT YOU WOULD BE BETTER OFF DEAD, OR OF HURTING YOURSELF: 0
5. POOR APPETITE OR OVEREATING: 0

## 2023-05-24 NOTE — PROGRESS NOTES
Christiano Wilson is a 79 y.o. female  Chief Complaint   Patient presents with    Hypertension    Hyperlipidemia     Pt presents after long hiatus. Back to us from another area provider, liked our customer service a little better. Hypertension. Blood pressures ok today. Management at last visit included continuing current regimen . Current regimen: ACE inhibitor and thiazide diuretic. Symptoms include no symptoms. Patient denies chest pain, palpitations, peripheral edema. Lab review: good labs 4/2019 at [facility]. See care everywhere. Overweight and hyperglycemia  Was doing well on victoza 1.8mg/d, but ran out of samples back in fall 2022. Wt did improve a lot, but coming back up. Last A1c 5.9 1/2023 with prior PCP    COPD and 3mm LLL pulm nodule  Prev seeing DR. Tiff Diez in [place]. On trelegy 100 again, ths time, saida well. Last  PFT's ~2y ago with Pulm, showed moderate COPD. Pt prev tried chantix, lasted 3 days, but felt really funny, loopy in head, so had to stop. No alcohol use. Tried nicotine patches, did well for ~6wk. Hasn't tried those again lately. Ready to try again     ASCVD and HLD, with SWAN, carotid atherosclerosis  Prev on lipitor 80mg daily but had a lot of muscle aches, and lipitor was held. Didn't seen any improvement though. Remains on ASA 81mg daily. Has Aortic atherosclerosis, coronary artery disease, and significant carotid stenosis, looked better on 2021 scan, 50-65% R, and <50% L side. Prev was 75% bilat (followed by Dr. Denisa Wright), but on more recent scan 2021, was mod R and , but stable and asyx, wants to wait until either worsening stenosis or sx onset. Last visit with them ~3y ago. Last lipids were pretty good. Last LFT's good. Not on advanced or double antiplt drug, just ASA. Working on quitting smoking soon. Last echo with good EF and wall motion, nl valves 1/2022.    Ref Range & Units 4 mo ago   Cholesterol, Total 100 - 199 mg/dL 181    Triglycerides 0 - 149 mg/dL 157 High
PRIMARY LEARNER Patient   PRIMARY LANGUAGE ENGLISH   LEARNER PREFERENCE PRIMARY LISTENING   ANSWERED BY patient   RELATIONSHIP SELF        Amb Fall Risk Assessment and TUG Test 5/24/2023   Do you feel unsteady or are you worried about falling?  no   2 or more falls in past year? no   Fall with injury in past year? no   Fall in past 12 months? -   Able to walk? -   Total Score -       ADL ASSESSMENT 5/24/2023   Feeding yourself No Help Needed   Getting from bed to chair No Help Needed   Getting dressed No Help Needed   Bathing or showering No Help Needed   Walk across the room (includes cane/walker) No Help Needed   Using the telphone No Help Needed   Taking your medications No Help Needed   Preparing meals No Help Needed   Managing money (expenses/bills) No Help Needed   Moderately strenuous housework (laundry) No Help Needed   Shopping for personal items (toiletries/medicines) No Help Needed   Shopping for groceries No Help Needed   Driving No Help Needed   Climbing a flight of stairs No Help Needed   Getting to places beyond walking distances No Help Needed       AMB Abuse Screening 5/24/2023   Do you ever feel afraid of your partner? N   Are you in a relationship with someone who physically or mentally threatens you? N   Is it safe for you to go home? Y       No advance directive on file. Emergency contacts verified.

## 2023-05-25 LAB
ALBUMIN SERPL-MCNC: 4.7 G/DL (ref 3.8–4.8)
ALBUMIN/GLOB SERPL: 1.9 {RATIO} (ref 1.2–2.2)
ALP SERPL-CCNC: 99 IU/L (ref 44–121)
ALT SERPL-CCNC: 15 IU/L (ref 0–32)
AST SERPL-CCNC: 20 IU/L (ref 0–40)
BILIRUB SERPL-MCNC: 0.2 MG/DL (ref 0–1.2)
BUN SERPL-MCNC: 7 MG/DL (ref 8–27)
BUN/CREAT SERPL: 12 (ref 12–28)
CALCIUM SERPL-MCNC: 9.8 MG/DL (ref 8.7–10.3)
CHLORIDE SERPL-SCNC: 105 MMOL/L (ref 96–106)
CO2 SERPL-SCNC: 26 MMOL/L (ref 20–29)
CREAT SERPL-MCNC: 0.6 MG/DL (ref 0.57–1)
EGFRCR SERPLBLD CKD-EPI 2021: 98 ML/MIN/1.73
ERYTHROCYTE [SEDIMENTATION RATE] IN BLOOD BY WESTERGREN METHOD: 5 MM/HR (ref 0–40)
GLOBULIN SER CALC-MCNC: 2.5 G/DL (ref 1.5–4.5)
GLUCOSE SERPL-MCNC: 80 MG/DL (ref 70–99)
HBA1C MFR BLD: 5.7 % (ref 4.8–5.6)
POTASSIUM SERPL-SCNC: 4.3 MMOL/L (ref 3.5–5.2)
PROT SERPL-MCNC: 7.2 G/DL (ref 6–8.5)
SODIUM SERPL-SCNC: 144 MMOL/L (ref 134–144)

## 2023-05-26 LAB
CRP SERPL-MCNC: 1 MG/L (ref 0–10)
SPECIMEN STATUS REPORT: NORMAL

## 2023-05-26 NOTE — RESULT ENCOUNTER NOTE
Sugar elevated. Pre-diabetes confirmed. Did you want to try again with victoza or try to get one of the once a week type injections to help?

## 2023-05-30 LAB
ALBUMIN SERPL-MCNC: 4.7 G/DL (ref 3.8–4.8)
ALBUMIN/GLOB SERPL: 1.9 {RATIO} (ref 1.2–2.2)
ALP SERPL-CCNC: 99 IU/L (ref 44–121)
ALT SERPL-CCNC: 15 IU/L (ref 0–32)
AST SERPL-CCNC: 20 IU/L (ref 0–40)
BILIRUB SERPL-MCNC: 0.2 MG/DL (ref 0–1.2)
BUN SERPL-MCNC: 7 MG/DL (ref 8–27)
BUN/CREAT SERPL: 12 (ref 12–28)
CALCIUM SERPL-MCNC: 9.8 MG/DL (ref 8.7–10.3)
CHLORIDE SERPL-SCNC: 105 MMOL/L (ref 96–106)
CO2 SERPL-SCNC: 26 MMOL/L (ref 20–29)
CREAT SERPL-MCNC: 0.6 MG/DL (ref 0.57–1)
CRP SERPL-MCNC: 1 MG/L (ref 0–10)
EGFRCR SERPLBLD CKD-EPI 2021: 98 ML/MIN/1.73
ERYTHROCYTE [SEDIMENTATION RATE] IN BLOOD BY WESTERGREN METHOD: 5 MM/HR (ref 0–40)
GLOBULIN SER CALC-MCNC: 2.5 G/DL (ref 1.5–4.5)
GLUCOSE SERPL-MCNC: 80 MG/DL (ref 70–99)
HBA1C MFR BLD: 5.7 % (ref 4.8–5.6)
POTASSIUM SERPL-SCNC: 4.3 MMOL/L (ref 3.5–5.2)
PROT SERPL-MCNC: 7.2 G/DL (ref 6–8.5)
SODIUM SERPL-SCNC: 144 MMOL/L (ref 134–144)
SPECIMEN STATUS REPORT: NORMAL

## 2023-06-02 LAB — DRUGS UR: NORMAL

## 2023-06-03 LAB — HEMOCCULT STL QL IA: NEGATIVE

## 2023-06-06 ENCOUNTER — PATIENT MESSAGE (OUTPATIENT)
Age: 68
End: 2023-06-06

## 2023-06-06 DIAGNOSIS — E66.01 SEVERE OBESITY (HCC): ICD-10-CM

## 2023-06-06 DIAGNOSIS — R73.9 HYPERGLYCEMIA: Primary | ICD-10-CM

## 2023-06-06 RX ORDER — LIRAGLUTIDE 6 MG/ML
0.6 INJECTION SUBCUTANEOUS DAILY
Qty: 2 ADJUSTABLE DOSE PRE-FILLED PEN SYRINGE | Refills: 11 | Status: SHIPPED | OUTPATIENT
Start: 2023-06-06

## 2023-06-06 NOTE — TELEPHONE ENCOUNTER
April Byron, [place] 6/6/2023 3:26 PM EDT      ----- Message -----  From: Von Cohen  Sent: 6/6/2023 3:26 PM EDT  To: , *  Subject: Dr Yesy Tolliver      Yes Dr Yesy Tolliver I would like to try Victoza I apologize trying to get used to My Chart I just read my test results Thank you have I great day See you June 14th

## 2023-06-14 PROBLEM — I25.10 ATHEROSCLEROSIS OF NATIVE CORONARY ARTERY OF NATIVE HEART WITHOUT ANGINA PECTORIS: Status: ACTIVE | Noted: 2017-07-15

## 2023-06-14 PROBLEM — Z80.0 FAMILY HX OF COLON CANCER REQUIRING SCREENING COLONOSCOPY: Status: ACTIVE | Noted: 2023-06-14

## 2023-06-19 RX ORDER — FLUTICASONE FUROATE, UMECLIDINIUM BROMIDE AND VILANTEROL TRIFENATATE 100; 62.5; 25 UG/1; UG/1; UG/1
1 POWDER RESPIRATORY (INHALATION) DAILY
Qty: 1 EACH | Refills: 11 | Status: SHIPPED | OUTPATIENT
Start: 2023-06-19

## 2023-06-22 DIAGNOSIS — R73.9 HYPERGLYCEMIA: Primary | ICD-10-CM

## 2023-06-22 RX ORDER — PEN NEEDLE, DIABETIC 30 GX5/16"
1 NEEDLE, DISPOSABLE MISCELLANEOUS DAILY
Qty: 100 EACH | Refills: 3 | Status: SHIPPED | OUTPATIENT
Start: 2023-06-22

## 2023-06-22 NOTE — TELEPHONE ENCOUNTER
Order for pen needles for victoza sent to pharmacy per request.     Requested Prescriptions     Pending Prescriptions Disp Refills    Insulin Pen Needle (PEN NEEDLES 3/16\") 31G X 5 MM MISC 100 each 3     Si each by Does not apply route daily

## 2023-07-13 ENCOUNTER — PATIENT MESSAGE (OUTPATIENT)
Age: 68
End: 2023-07-13

## 2023-07-13 DIAGNOSIS — G89.29 OTHER CHRONIC PAIN: ICD-10-CM

## 2023-07-14 ENCOUNTER — OFFICE VISIT (OUTPATIENT)
Age: 68
End: 2023-07-14

## 2023-07-14 ENCOUNTER — PATIENT MESSAGE (OUTPATIENT)
Age: 68
End: 2023-07-14

## 2023-07-14 VITALS
OXYGEN SATURATION: 98 % | HEIGHT: 64 IN | RESPIRATION RATE: 18 BRPM | BODY MASS INDEX: 31.04 KG/M2 | HEART RATE: 62 BPM | WEIGHT: 181.8 LBS | SYSTOLIC BLOOD PRESSURE: 126 MMHG | DIASTOLIC BLOOD PRESSURE: 62 MMHG

## 2023-07-14 DIAGNOSIS — R73.9 HYPERGLYCEMIA: ICD-10-CM

## 2023-07-14 DIAGNOSIS — G89.29 OTHER CHRONIC PAIN: ICD-10-CM

## 2023-07-14 DIAGNOSIS — I25.10 ATHEROSCLEROSIS OF NATIVE CORONARY ARTERY OF NATIVE HEART WITHOUT ANGINA PECTORIS: ICD-10-CM

## 2023-07-14 DIAGNOSIS — J43.9 PULMONARY EMPHYSEMA, UNSPECIFIED EMPHYSEMA TYPE (HCC): Primary | ICD-10-CM

## 2023-07-14 DIAGNOSIS — M43.16 SPONDYLOLISTHESIS OF LUMBAR REGION: ICD-10-CM

## 2023-07-14 DIAGNOSIS — I65.23 BILATERAL CAROTID ARTERY STENOSIS: ICD-10-CM

## 2023-07-14 DIAGNOSIS — E66.3 OVERWEIGHT: ICD-10-CM

## 2023-07-14 DIAGNOSIS — I10 ESSENTIAL HYPERTENSION: ICD-10-CM

## 2023-07-14 RX ORDER — OXYCODONE HYDROCHLORIDE AND ACETAMINOPHEN 5; 325 MG/1; MG/1
1 TABLET ORAL 4 TIMES DAILY PRN
Qty: 120 TABLET | Refills: 0 | Status: SHIPPED | OUTPATIENT
Start: 2023-08-15 | End: 2023-09-14

## 2023-07-14 RX ORDER — OXYCODONE HYDROCHLORIDE AND ACETAMINOPHEN 5; 325 MG/1; MG/1
1 TABLET ORAL 4 TIMES DAILY PRN
Qty: 120 TABLET | Refills: 0 | Status: SHIPPED | OUTPATIENT
Start: 2023-07-16 | End: 2023-07-14 | Stop reason: SDUPTHER

## 2023-07-14 RX ORDER — OXYCODONE HYDROCHLORIDE AND ACETAMINOPHEN 5; 325 MG/1; MG/1
1 TABLET ORAL 4 TIMES DAILY PRN
Qty: 120 TABLET | Refills: 0 | Status: SHIPPED | OUTPATIENT
Start: 2023-08-15 | End: 2023-07-14 | Stop reason: SDUPTHER

## 2023-07-14 RX ORDER — OXYCODONE HYDROCHLORIDE AND ACETAMINOPHEN 5; 325 MG/1; MG/1
1 TABLET ORAL 4 TIMES DAILY PRN
Qty: 120 TABLET | Refills: 0 | Status: SHIPPED | OUTPATIENT
Start: 2023-07-14 | End: 2023-08-13

## 2023-07-14 RX ORDER — OXYCODONE HYDROCHLORIDE AND ACETAMINOPHEN 5; 325 MG/1; MG/1
TABLET ORAL
Qty: 120 TABLET | Refills: 0 | OUTPATIENT
Start: 2023-07-14

## 2023-07-14 ASSESSMENT — PATIENT HEALTH QUESTIONNAIRE - PHQ9
SUM OF ALL RESPONSES TO PHQ QUESTIONS 1-9: 0
SUM OF ALL RESPONSES TO PHQ QUESTIONS 1-9: 0
10. IF YOU CHECKED OFF ANY PROBLEMS, HOW DIFFICULT HAVE THESE PROBLEMS MADE IT FOR YOU TO DO YOUR WORK, TAKE CARE OF THINGS AT HOME, OR GET ALONG WITH OTHER PEOPLE: 0
SUM OF ALL RESPONSES TO PHQ QUESTIONS 1-9: 0
9. THOUGHTS THAT YOU WOULD BE BETTER OFF DEAD, OR OF HURTING YOURSELF: 0
5. POOR APPETITE OR OVEREATING: 0
1. LITTLE INTEREST OR PLEASURE IN DOING THINGS: 0
2. FEELING DOWN, DEPRESSED OR HOPELESS: 0
SUM OF ALL RESPONSES TO PHQ9 QUESTIONS 1 & 2: 0
6. FEELING BAD ABOUT YOURSELF - OR THAT YOU ARE A FAILURE OR HAVE LET YOURSELF OR YOUR FAMILY DOWN: 0
3. TROUBLE FALLING OR STAYING ASLEEP: 0
7. TROUBLE CONCENTRATING ON THINGS, SUCH AS READING THE NEWSPAPER OR WATCHING TELEVISION: 0
4. FEELING TIRED OR HAVING LITTLE ENERGY: 0
SUM OF ALL RESPONSES TO PHQ QUESTIONS 1-9: 0
8. MOVING OR SPEAKING SO SLOWLY THAT OTHER PEOPLE COULD HAVE NOTICED. OR THE OPPOSITE, BEING SO FIGETY OR RESTLESS THAT YOU HAVE BEEN MOVING AROUND A LOT MORE THAN USUAL: 0

## 2023-07-14 NOTE — TELEPHONE ENCOUNTER
Zahra Pompa, [place] 0/[id number]/[phone] 6:73 PM EDT      ----- Message -----  From: Julianna Ayon  Sent: 7/14/2023 5:12 PM EDT  To: , *  Subject: Dr Vikram Sadler could you please call my pain medication in to [facility] me and the young lady at [facility] just kind of got into a verbal confrontation so please I would appreciate it if you would do so in to [place] into [facility] for my pain medication I told her my medication was due tomorrow that that was 30 days.  She got smart with me and I will not have that, so I would appreciate it so much if you could call my medicine into [facility] thank you so much

## 2023-07-14 NOTE — PROGRESS NOTES
Jonatan Ovalles is a 79 y.o. female  No chief complaint on file. Hypertension  Blood pressures ok today. Management at last visit included continuing current regimen . Current regimen: ACE inhibitor and thiazide diuretic. Symptoms include no symptoms. Patient denies chest pain, palpitations, peripheral edema. Lab review: good labs 4/2019 at [facility]. See care everywhere. Lab review:   Lab Results   Component Value Date     05/24/2023    K 4.3 05/24/2023     05/24/2023    CO2 26 05/24/2023    GLUCOSE 80 05/24/2023    BUN 7 (L) 05/24/2023    CREATININE 0.60 05/24/2023     Overweight and hyperglycemia  Doing well on victoza 0.6mg/d, getting ok from insurance. Lab Results   Component Value Date    LABA1C 5.7 (H) 05/24/2023    GLUCOSE 80 05/24/2023    CREATININE 0.60 05/24/2023     COPD and 3mm LLL pulm nodule  Prev seeing DR. Juan Dickerson in [place]. On trelegy 100 again, ths time, siada well. Last  PFT's ~2y ago with Pulm, showed moderate COPD. Pt prev tried chantix, lasted 3 days, but felt really funny, loopy in head, so had to stop. No alcohol use. Tried nicotine patches, did well for ~6wk. Hasn't tried those again lately. Ready to try again     ASCVD and HLD, with SWAN, carotid atherosclerosis  Prev on lipitor 80mg daily but had a lot of muscle aches, and lipitor was held. Didn't seen any improvement though. Remains on ASA 81mg daily. Has Aortic atherosclerosis, coronary artery disease, and significant carotid stenosis, looked better on 2021 scan, 50-65% R, and <50% L side. Prev was 75% bilat (followed by Dr. Jemima Gomez), but on more recent scan 2021, was mod R and , but stable and asyx, wants to wait until either worsening stenosis or sx onset. Last visit with them ~3y ago. Last lipids were pretty good. Last LFT's good. Not on advanced or double antiplt drug, just ASA. Working on quitting smoking soon. Last echo with good EF and wall motion, nl valves 1/2022.    Ref Range & Units 4 mo ago

## 2023-07-14 NOTE — TELEPHONE ENCOUNTER
Moved Rx to [facility] at pt request. Did explain to pt that it was my oversight on the fill date, gave apology. Pt gave understanding.

## 2023-07-14 NOTE — PROGRESS NOTES
Von Seay is a 79 y.o. female presenting for/with:    No chief complaint on file. Vitals:    07/14/23 1526   BP: 126/62   Site: Left Upper Arm   Position: Sitting   Cuff Size: Medium Adult   Pulse: 62   Resp: 18   SpO2: 98%   Weight: 181 lb 12.8 oz (82.5 kg)   Height: 5' 6\" (1.676 m)       Pain Scale: 10 - Worst pain ever/10  Pain Location: Back    1. \"Have you been to the ER, urgent care clinic since your last visit? Hospitalized since your last visit? \" No    2. \"Have you seen or consulted any other health care providers outside of the [address] [place] [address] since your last visit? \" No     3. For patients aged 43-73: Has the patient had a colonoscopy / FIT/ Cologuard? Yes - no Care Gap present     If the patient is female:    4. For patients aged 43-66: Has the patient had a mammogram within the past 2 years? No    5. For patients aged 21-65: Has the patient had a pap smear? NA - based on age      PHQ-9  7/14/2023   Little interest or pleasure in doing things 0   Feeling down, depressed, or hopeless 0   Trouble falling or staying asleep, or sleeping too much 0   Feeling tired or having little energy 0   Poor appetite or overeating 0   Feeling bad about yourself - or that you are a failure or have let yourself or your family down 0   Trouble concentrating on things, such as reading the newspaper or watching television 0   Moving or speaking so slowly that other people could have noticed.  Or the opposite - being so fidgety or restless that you have been moving around a lot more than usual 0   Thoughts that you would be better off dead, or of hurting yourself in some way 0   PHQ-2 Score 0   PHQ-9 Total Score 0   If you checked off any problems, how difficult have these problems made it for you to do your work, take care of things at home, or get along with other people? 0       [id number] Ricarda Galvan [facility],[address] AMB LEARNING ASSESSMENT 5/24/2023   PRIMARY LEARNER Patient   PRIMARY LANGUAGE ENGLISH   LEARNER PREFERENCE PRIMARY

## 2023-07-29 DIAGNOSIS — E66.01 SEVERE OBESITY (HCC): ICD-10-CM

## 2023-07-29 DIAGNOSIS — R73.9 HYPERGLYCEMIA: ICD-10-CM

## 2023-08-01 RX ORDER — LIRAGLUTIDE 6 MG/ML
0.6 INJECTION SUBCUTANEOUS DAILY
Qty: 2 ADJUSTABLE DOSE PRE-FILLED PEN SYRINGE | Refills: 11 | OUTPATIENT
Start: 2023-08-01

## 2023-08-09 DIAGNOSIS — M43.16 SPONDYLOLISTHESIS OF LUMBAR REGION: ICD-10-CM

## 2023-08-09 DIAGNOSIS — G89.29 OTHER CHRONIC PAIN: ICD-10-CM

## 2023-08-09 RX ORDER — OXYCODONE HYDROCHLORIDE AND ACETAMINOPHEN 5; 325 MG/1; MG/1
1 TABLET ORAL 4 TIMES DAILY PRN
Qty: 120 TABLET | Refills: 0 | OUTPATIENT
Start: 2023-08-09 | End: 2023-09-08

## 2023-08-09 NOTE — TELEPHONE ENCOUNTER
[place]  reviewed. Fill pattern in goal. Got Jul fill 7/14, 2 days early (from 6/16 fill). So had 2 extra days of meds, which should have carried over into July, so is not due for AUG fill until 8/15, as we discussed at visit. Has ore already in for 8/15.

## 2023-08-11 ENCOUNTER — TELEPHONE (OUTPATIENT)
Age: 68
End: 2023-08-11

## 2023-08-11 NOTE — TELEPHONE ENCOUNTER
Pt has run dry from her July oxycodone order. I noted that she should have had 60 days of meds from 6/16/23. She has 2 pills left today, so she's been using slightly more than 120 tabs in 30 days. Per spouse, this is not the first time she has run out early in the past 6 mo. I let pt know that this is a concerning behavior pattern and she needs to stick to the med orders, and not overuse. Based on her current supply and use pattern, she is likely to go into withdrawals this weekend. I offered an order for clonidine, and I noted that [facility] may have intake visits in the next few days, if she is interested in eventually coming off of opioids. PT gave understanding, will let me know if wants clonidine.

## 2023-08-14 ENCOUNTER — TELEPHONE (OUTPATIENT)
Age: 68
End: 2023-08-14

## 2023-10-13 ENCOUNTER — TELEPHONE (OUTPATIENT)
Age: 68
End: 2023-10-13

## 2023-10-13 DIAGNOSIS — E66.3 OVERWEIGHT: ICD-10-CM

## 2023-10-13 DIAGNOSIS — R73.9 HYPERGLYCEMIA: ICD-10-CM

## 2023-10-13 NOTE — TELEPHONE ENCOUNTER
[address] [place] [address] in [place] called and needs a new prescription for Victoza 18mg/3ml. Current prescription instructs patient to inject 1.2 mg and the patient stated that she received instructions from you to inject 1.8 mg.

## 2023-10-16 DIAGNOSIS — E66.3 OVERWEIGHT: ICD-10-CM

## 2023-10-16 DIAGNOSIS — R73.9 HYPERGLYCEMIA: ICD-10-CM

## 2024-03-27 ENCOUNTER — TELEPHONE (OUTPATIENT)
Age: 69
End: 2024-03-27

## 2024-03-27 NOTE — TELEPHONE ENCOUNTER
Called patient to [facility] mammogram   She was not in per    Will send my chart and try to call back later

## 2024-06-21 ENCOUNTER — HOSPITAL ENCOUNTER (OUTPATIENT)
Facility: HOSPITAL | Age: 69
End: 2024-06-21
Payer: MEDICAID

## 2024-06-21 VITALS — WEIGHT: 181 LBS | BODY MASS INDEX: 31.07 KG/M2

## 2024-06-21 DIAGNOSIS — D17.23 LIPOMA OF RIGHT LOWER EXTREMITY: ICD-10-CM

## 2024-06-21 DIAGNOSIS — Z12.31 ENCOUNTER FOR SCREENING MAMMOGRAM FOR MALIGNANT NEOPLASM OF BREAST: ICD-10-CM

## 2024-06-21 PROCEDURE — 76882 US LMTD JT/FCL EVL NVASC XTR: CPT

## 2024-06-21 PROCEDURE — 77067 SCR MAMMO BI INCL CAD: CPT

## 2024-07-23 ENCOUNTER — HOSPITAL ENCOUNTER (OUTPATIENT)
Facility: HOSPITAL | Age: 69
Discharge: HOME | End: 2024-07-26
Payer: MEDICAID

## 2024-07-23 ENCOUNTER — TRANSCRIBE ORDERS (OUTPATIENT)
Facility: HOSPITAL | Age: 69
End: 2024-07-23

## 2024-07-23 DIAGNOSIS — F17.210 CIGARETTE SMOKER: ICD-10-CM

## 2024-07-23 DIAGNOSIS — F17.210 CIGARETTE SMOKER: Primary | ICD-10-CM

## 2024-07-23 PROCEDURE — 71046 X-RAY EXAM CHEST 2 VIEWS: CPT

## 2024-08-24 DIAGNOSIS — R73.9 HYPERGLYCEMIA: ICD-10-CM

## 2024-08-24 DIAGNOSIS — E66.3 OVERWEIGHT: ICD-10-CM

## 2024-08-26 RX ORDER — LIRAGLUTIDE 6 MG/ML
INJECTION SUBCUTANEOUS
Qty: 6 ML | Refills: 0 | OUTPATIENT
Start: 2024-08-26

## 2024-09-19 ENCOUNTER — HOSPITAL ENCOUNTER (OUTPATIENT)
Facility: HOSPITAL | Age: 69
Discharge: HOME | End: 2024-09-22
Payer: MEDICAID

## 2024-09-19 DIAGNOSIS — Z78.0 MENOPAUSE: ICD-10-CM

## 2024-09-19 PROCEDURE — 77080 DXA BONE DENSITY AXIAL: CPT

## 2024-09-26 DIAGNOSIS — R73.9 HYPERGLYCEMIA: ICD-10-CM

## 2024-09-26 DIAGNOSIS — E66.3 OVERWEIGHT: ICD-10-CM

## 2024-09-26 RX ORDER — LIRAGLUTIDE 6 MG/ML
INJECTION SUBCUTANEOUS
Qty: 6 ML | Refills: 0 | OUTPATIENT
Start: 2024-09-26

## 2024-10-02 DIAGNOSIS — R73.9 HYPERGLYCEMIA: ICD-10-CM

## 2024-10-02 DIAGNOSIS — E66.3 OVERWEIGHT: ICD-10-CM

## 2024-10-02 RX ORDER — LIRAGLUTIDE 6 MG/ML
INJECTION SUBCUTANEOUS
Qty: 6 ML | Refills: 0 | OUTPATIENT
Start: 2024-10-02

## 2024-10-04 DIAGNOSIS — E66.3 OVERWEIGHT: ICD-10-CM

## 2024-10-04 DIAGNOSIS — R73.9 HYPERGLYCEMIA: ICD-10-CM

## 2024-10-04 RX ORDER — LIRAGLUTIDE 6 MG/ML
INJECTION SUBCUTANEOUS
Qty: 6 ML | Refills: 0 | OUTPATIENT
Start: 2024-10-04

## 2025-01-30 ENCOUNTER — HOSPITAL ENCOUNTER (OUTPATIENT)
Facility: HOSPITAL | Age: 70
Discharge: HOME | End: 2025-01-30
Payer: MEDICAID

## 2025-01-30 DIAGNOSIS — I65.23 BILATERAL CAROTID ARTERY STENOSIS: ICD-10-CM

## 2025-01-30 PROCEDURE — 93880 EXTRACRANIAL BILAT STUDY: CPT

## 2025-01-31 LAB
VAS LEFT CCA DIST EDV: 19.1 CM/S
VAS LEFT CCA DIST PSV: 84.7 CM/S
VAS LEFT CCA PROX EDV: 23 CM/S
VAS LEFT CCA PROX PSV: 67.8 CM/S
VAS LEFT ECA EDV: 28.9 CM/S
VAS LEFT ECA PSV: 212.2 CM/S
VAS LEFT ICA DIST EDV: 11.8 CM/S
VAS LEFT ICA DIST PSV: 51.3 CM/S
VAS LEFT ICA MID EDV: 30 CM/S
VAS LEFT ICA MID PSV: 109.2 CM/S
VAS LEFT ICA PROX EDV: 16.2 CM/S
VAS LEFT ICA PROX PSV: 69 CM/S
VAS LEFT VERTEBRAL EDV: 22.3 CM/S
VAS LEFT VERTEBRAL PSV: 95.9 CM/S
VAS RIGHT CCA DIST EDV: 19.4 CM/S
VAS RIGHT CCA DIST PSV: 76.8 CM/S
VAS RIGHT CCA PROX EDV: 16.8 CM/S
VAS RIGHT CCA PROX PSV: 83.4 CM/S
VAS RIGHT ECA EDV: 35.2 CM/S
VAS RIGHT ECA PSV: 184.9 CM/S
VAS RIGHT ICA DIST EDV: 12.9 CM/S
VAS RIGHT ICA DIST PSV: 49 CM/S
VAS RIGHT ICA MID EDV: 19.7 CM/S
VAS RIGHT ICA MID PSV: 122.2 CM/S
VAS RIGHT ICA PROX EDV: 26.8 CM/S
VAS RIGHT ICA PROX PSV: 169 CM/S
VAS RIGHT VERTEBRAL EDV: 15.5 CM/S
VAS RIGHT VERTEBRAL PSV: 57.3 CM/S

## 2025-06-06 ENCOUNTER — HOSPITAL ENCOUNTER (OUTPATIENT)
Facility: HOSPITAL | Age: 70
Discharge: HOME | End: 2025-06-09
Payer: MEDICAID

## 2025-06-06 ENCOUNTER — TRANSCRIBE ORDERS (OUTPATIENT)
Facility: HOSPITAL | Age: 70
End: 2025-06-06

## 2025-06-06 DIAGNOSIS — M43.10 SPONDYLOLISTHESIS, UNSPECIFIED SPINAL REGION: ICD-10-CM

## 2025-06-06 DIAGNOSIS — M43.10 SPONDYLOLISTHESIS, UNSPECIFIED SPINAL REGION: Primary | ICD-10-CM

## 2025-06-06 PROCEDURE — 72100 X-RAY EXAM L-S SPINE 2/3 VWS: CPT
